# Patient Record
Sex: MALE | Race: BLACK OR AFRICAN AMERICAN | NOT HISPANIC OR LATINO | Employment: FULL TIME | URBAN - METROPOLITAN AREA
[De-identification: names, ages, dates, MRNs, and addresses within clinical notes are randomized per-mention and may not be internally consistent; named-entity substitution may affect disease eponyms.]

---

## 2021-10-04 ENCOUNTER — HISTORICAL (OUTPATIENT)
Dept: ADMINISTRATIVE | Facility: HOSPITAL | Age: 26
End: 2021-10-04

## 2021-10-04 LAB — SARS-COV-2 AG RESP QL IA.RAPID: NEGATIVE

## 2021-10-05 ENCOUNTER — HISTORICAL (OUTPATIENT)
Dept: SURGERY | Facility: HOSPITAL | Age: 26
End: 2021-10-05

## 2022-02-12 ENCOUNTER — HISTORICAL (OUTPATIENT)
Dept: ADMINISTRATIVE | Facility: HOSPITAL | Age: 27
End: 2022-02-12

## 2022-04-07 ENCOUNTER — HISTORICAL (OUTPATIENT)
Dept: ADMINISTRATIVE | Facility: HOSPITAL | Age: 27
End: 2022-04-07
Payer: MEDICAID

## 2022-04-24 VITALS
BODY MASS INDEX: 28.31 KG/M2 | WEIGHT: 244.69 LBS | OXYGEN SATURATION: 99 % | SYSTOLIC BLOOD PRESSURE: 99 MMHG | DIASTOLIC BLOOD PRESSURE: 58 MMHG | HEIGHT: 78 IN

## 2022-04-25 ENCOUNTER — HISTORICAL (OUTPATIENT)
Dept: ADMINISTRATIVE | Facility: HOSPITAL | Age: 27
End: 2022-04-25
Payer: MEDICAID

## 2022-05-31 ENCOUNTER — HOSPITAL ENCOUNTER (EMERGENCY)
Facility: HOSPITAL | Age: 27
Discharge: HOME | End: 2022-05-31
Attending: EMERGENCY MEDICINE
Payer: MEDICAID

## 2022-05-31 VITALS
SYSTOLIC BLOOD PRESSURE: 139 MMHG | DIASTOLIC BLOOD PRESSURE: 85 MMHG | WEIGHT: 248 LBS | TEMPERATURE: 99 F | BODY MASS INDEX: 28.69 KG/M2 | RESPIRATION RATE: 17 BRPM | HEART RATE: 50 BPM | OXYGEN SATURATION: 98 % | HEIGHT: 78 IN

## 2022-05-31 DIAGNOSIS — S01.01XA LACERATION OF SCALP, INITIAL ENCOUNTER: Primary | ICD-10-CM

## 2022-05-31 PROCEDURE — 99284 EMERGENCY DEPT VISIT MOD MDM: CPT | Mod: 25

## 2022-05-31 PROCEDURE — 25000003 PHARM REV CODE 250: Performed by: PHYSICIAN ASSISTANT

## 2022-05-31 PROCEDURE — 12013 RPR F/E/E/N/L/M 2.6-5.0 CM: CPT

## 2022-05-31 PROCEDURE — 25000003 PHARM REV CODE 250

## 2022-05-31 PROCEDURE — 12002 RPR S/N/AX/GEN/TRNK2.6-7.5CM: CPT

## 2022-05-31 RX ORDER — HYDROCODONE BITARTRATE AND ACETAMINOPHEN 5; 325 MG/1; MG/1
1 TABLET ORAL
Status: COMPLETED | OUTPATIENT
Start: 2022-05-31 | End: 2022-05-31

## 2022-05-31 RX ORDER — HYDROCODONE BITARTRATE AND ACETAMINOPHEN 5; 325 MG/1; MG/1
1 TABLET ORAL EVERY 6 HOURS PRN
Qty: 12 TABLET | Refills: 0 | Status: SHIPPED | OUTPATIENT
Start: 2022-05-31 | End: 2022-06-03

## 2022-05-31 RX ORDER — SULFAMETHOXAZOLE AND TRIMETHOPRIM 800; 160 MG/1; MG/1
1 TABLET ORAL 2 TIMES DAILY
Qty: 14 TABLET | Refills: 0 | Status: SHIPPED | OUTPATIENT
Start: 2022-05-31 | End: 2022-06-07

## 2022-05-31 RX ADMIN — HYDROCODONE BITARTRATE AND ACETAMINOPHEN 1 TABLET: 5; 325 TABLET ORAL at 07:05
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
## 2022-06-07 ENCOUNTER — HOSPITAL ENCOUNTER (EMERGENCY)
Facility: HOSPITAL | Age: 27
Discharge: HOME | End: 2022-06-07
Attending: EMERGENCY MEDICINE
Payer: MEDICAID

## 2022-06-07 VITALS
OXYGEN SATURATION: 99 % | WEIGHT: 240 LBS | SYSTOLIC BLOOD PRESSURE: 121 MMHG | BODY MASS INDEX: 27.77 KG/M2 | HEIGHT: 78 IN | DIASTOLIC BLOOD PRESSURE: 68 MMHG | TEMPERATURE: 99 F | RESPIRATION RATE: 20 BRPM | HEART RATE: 56 BPM

## 2022-06-07 DIAGNOSIS — Z48.02 REMOVAL OF STAPLE: Primary | ICD-10-CM

## 2022-06-07 PROCEDURE — 99282 EMERGENCY DEPT VISIT SF MDM: CPT

## 2022-06-07 NOTE — ED NOTES
3 staples removed from the back of patient's head. No abnormalities noted after removal. Pending disposition.
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
## 2023-01-02 ENCOUNTER — OFFICE VISIT (OUTPATIENT)
Dept: URGENT CARE | Facility: CLINIC | Age: 28
End: 2023-01-02
Payer: COMMERCIAL

## 2023-01-02 VITALS
DIASTOLIC BLOOD PRESSURE: 89 MMHG | HEIGHT: 77 IN | BODY MASS INDEX: 30.7 KG/M2 | SYSTOLIC BLOOD PRESSURE: 130 MMHG | OXYGEN SATURATION: 98 % | TEMPERATURE: 98 F | HEART RATE: 68 BPM | RESPIRATION RATE: 18 BRPM | WEIGHT: 260 LBS

## 2023-01-02 DIAGNOSIS — M79.671 RIGHT FOOT PAIN: Primary | ICD-10-CM

## 2023-01-02 PROCEDURE — 99213 PR OFFICE/OUTPT VISIT, EST, LEVL III, 20-29 MIN: ICD-10-PCS | Mod: ,,, | Performed by: FAMILY MEDICINE

## 2023-01-02 PROCEDURE — 99213 OFFICE O/P EST LOW 20 MIN: CPT | Mod: ,,, | Performed by: FAMILY MEDICINE

## 2023-01-02 RX ORDER — HYDROCODONE BITARTRATE AND ACETAMINOPHEN 5; 325 MG/1; MG/1
1 TABLET ORAL EVERY 8 HOURS PRN
Qty: 15 TABLET | Refills: 0 | Status: SHIPPED | OUTPATIENT
Start: 2023-01-02 | End: 2023-03-11
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
## 2023-01-02 NOTE — PATIENT INSTRUCTIONS
Recommend Compound W  Wart Removal plantar foot pads    Someone will call you with the dermatology appointment.

## 2023-01-04 ENCOUNTER — TELEPHONE (OUTPATIENT)
Dept: URGENT CARE | Facility: CLINIC | Age: 28
End: 2023-01-04
Payer: COMMERCIAL

## 2023-01-05 NOTE — TELEPHONE ENCOUNTER
Internal referral to [facility] was not accepted due to patient's insurance. Contacted patient and made aware that Dr. Carl Solis's office would accept services under self- pay. Patient agreed to terms and would only need referral to be rerouted.please advise.

## 2023-01-09 ENCOUNTER — TELEPHONE (OUTPATIENT)
Dept: URGENT CARE | Facility: CLINIC | Age: 28
End: 2023-01-09
Payer: COMMERCIAL

## 2023-01-09 NOTE — TELEPHONE ENCOUNTER
Referral Documentation 01/02/23    Spoke with Dr. Solis's [place] and she took down patient's contact information to set up an appointment. Faxed over referral for documentation. 01/09/23-ALL    Contacted several offices. Notified patient that Dr. Solis would see him with an out of pocket charge,and he agreed.Gave office number. Message sent to referring provider to advise. - 01/04/2023 -ALL    [facility] ;do not accept patien't insurance.01/03/2023- All    Referral Fax to [facility] Dr. Claudia Jackson on 01/03/2023-DJ

## 2023-01-12 ENCOUNTER — HOSPITAL ENCOUNTER (EMERGENCY)
Facility: HOSPITAL | Age: 28
Discharge: HOME | End: 2023-01-12
Attending: STUDENT IN AN ORGANIZED HEALTH CARE EDUCATION/TRAINING PROGRAM
Payer: COMMERCIAL

## 2023-01-12 VITALS
DIASTOLIC BLOOD PRESSURE: 88 MMHG | SYSTOLIC BLOOD PRESSURE: 138 MMHG | WEIGHT: 260 LBS | BODY MASS INDEX: 30.7 KG/M2 | OXYGEN SATURATION: 100 % | HEART RATE: 78 BPM | RESPIRATION RATE: 16 BRPM | TEMPERATURE: 98 F | HEIGHT: 77 IN

## 2023-01-12 DIAGNOSIS — R52 PAIN: ICD-10-CM

## 2023-01-12 DIAGNOSIS — B07.0 PLANTAR WART: Primary | ICD-10-CM

## 2023-01-12 PROCEDURE — 25000003 PHARM REV CODE 250: Performed by: PHYSICIAN ASSISTANT

## 2023-01-12 PROCEDURE — 99283 EMERGENCY DEPT VISIT LOW MDM: CPT

## 2023-01-12 RX ORDER — KETOROLAC TROMETHAMINE 10 MG/1
10 TABLET, FILM COATED ORAL
Status: COMPLETED | OUTPATIENT
Start: 2023-01-12 | End: 2023-01-12

## 2023-01-12 RX ORDER — KETOROLAC TROMETHAMINE 10 MG/1
10 TABLET, FILM COATED ORAL EVERY 6 HOURS PRN
Qty: 20 TABLET | Refills: 0 | Status: SHIPPED | OUTPATIENT
Start: 2023-01-12 | End: 2023-01-17

## 2023-01-12 RX ADMIN — KETOROLAC TROMETHAMINE 10 MG: 10 TABLET, FILM COATED ORAL at 09:01
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
## 2023-03-11 ENCOUNTER — HOSPITAL ENCOUNTER (EMERGENCY)
Facility: HOSPITAL | Age: 28
Discharge: HOME | End: 2023-03-11
Attending: EMERGENCY MEDICINE
Payer: COMMERCIAL

## 2023-03-11 VITALS
TEMPERATURE: 97 F | BODY MASS INDEX: 31.17 KG/M2 | HEART RATE: 71 BPM | WEIGHT: 264 LBS | DIASTOLIC BLOOD PRESSURE: 77 MMHG | HEIGHT: 77 IN | SYSTOLIC BLOOD PRESSURE: 151 MMHG | RESPIRATION RATE: 18 BRPM | OXYGEN SATURATION: 100 %

## 2023-03-11 DIAGNOSIS — B07.0 PLANTAR WART: Primary | ICD-10-CM

## 2023-03-11 DIAGNOSIS — M79.671 FOOT PAIN, RIGHT: ICD-10-CM

## 2023-03-11 PROCEDURE — 99283 EMERGENCY DEPT VISIT LOW MDM: CPT

## 2023-03-11 RX ORDER — SALICYLIC ACID 275 MG/ML
1 LIQUID TOPICAL DAILY
Qty: 10 ML | Refills: 0 | Status: SHIPPED | OUTPATIENT
Start: 2023-03-11 | End: 2023-03-21

## 2023-03-11 NOTE — Clinical Note
"Brian Coronado"Nusrat was seen and treated in our emergency department on 3/11/2023.  He may return to work on 03/20/2023.       If you have any questions or concerns, please don't hesitate to call.      Claudine Hernandez MD"
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
## 2023-04-26 ENCOUNTER — HOSPITAL ENCOUNTER (EMERGENCY)
Facility: HOSPITAL | Age: 28
Discharge: HOME | End: 2023-04-26
Attending: EMERGENCY MEDICINE
Payer: MEDICAID

## 2023-04-26 VITALS
HEIGHT: 77 IN | RESPIRATION RATE: 16 BRPM | SYSTOLIC BLOOD PRESSURE: 135 MMHG | DIASTOLIC BLOOD PRESSURE: 78 MMHG | HEART RATE: 93 BPM | WEIGHT: 265 LBS | TEMPERATURE: 98 F | BODY MASS INDEX: 31.29 KG/M2 | OXYGEN SATURATION: 95 %

## 2023-04-26 DIAGNOSIS — B07.0 PLANTAR WART OF RIGHT FOOT: Primary | ICD-10-CM

## 2023-04-26 PROCEDURE — 99283 EMERGENCY DEPT VISIT LOW MDM: CPT

## 2023-04-26 RX ORDER — DICLOFENAC SODIUM 50 MG/1
50 TABLET, DELAYED RELEASE ORAL 3 TIMES DAILY PRN
Qty: 15 TABLET | Refills: 0 | Status: SHIPPED | OUTPATIENT
Start: 2023-04-26 | End: 2023-05-01
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
## 2023-05-18 ENCOUNTER — HOSPITAL ENCOUNTER (EMERGENCY)
Facility: HOSPITAL | Age: 28
Discharge: HOME | End: 2023-05-18
Attending: INTERNAL MEDICINE
Payer: MEDICAID

## 2023-05-18 VITALS
WEIGHT: 258 LBS | BODY MASS INDEX: 29.85 KG/M2 | HEIGHT: 78 IN | RESPIRATION RATE: 18 BRPM | SYSTOLIC BLOOD PRESSURE: 150 MMHG | HEART RATE: 73 BPM | OXYGEN SATURATION: 98 % | DIASTOLIC BLOOD PRESSURE: 97 MMHG

## 2023-05-18 DIAGNOSIS — L08.9 LOCAL SKIN INFECTION: ICD-10-CM

## 2023-05-18 DIAGNOSIS — B07.0 PLANTAR WART, RIGHT FOOT: Primary | ICD-10-CM

## 2023-05-18 PROCEDURE — 25000003 PHARM REV CODE 250: Performed by: INTERNAL MEDICINE

## 2023-05-18 PROCEDURE — 99284 EMERGENCY DEPT VISIT MOD MDM: CPT

## 2023-05-18 RX ORDER — DOXYCYCLINE 100 MG/1
100 CAPSULE ORAL 2 TIMES DAILY
Qty: 14 CAPSULE | Refills: 0 | Status: SHIPPED | OUTPATIENT
Start: 2023-05-18 | End: 2023-05-25

## 2023-05-18 RX ORDER — HYDROCODONE BITARTRATE AND ACETAMINOPHEN 5; 325 MG/1; MG/1
1 TABLET ORAL ONCE
Status: COMPLETED | OUTPATIENT
Start: 2023-05-18 | End: 2023-05-18

## 2023-05-18 RX ORDER — MUPIROCIN 20 MG/G
OINTMENT TOPICAL 3 TIMES DAILY
Qty: 22 G | Refills: 0 | Status: SHIPPED | OUTPATIENT
Start: 2023-05-18 | End: 2023-12-26

## 2023-05-18 RX ADMIN — HYDROCODONE BITARTRATE AND ACETAMINOPHEN 1 TABLET: 5; 325 TABLET ORAL at 11:05
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
## 2023-06-13 ENCOUNTER — HOSPITAL ENCOUNTER (EMERGENCY)
Facility: HOSPITAL | Age: 28
Discharge: HOME | End: 2023-06-14
Attending: EMERGENCY MEDICINE
Payer: MEDICAID

## 2023-06-13 VITALS
RESPIRATION RATE: 18 BRPM | SYSTOLIC BLOOD PRESSURE: 130 MMHG | DIASTOLIC BLOOD PRESSURE: 81 MMHG | HEART RATE: 83 BPM | TEMPERATURE: 99 F | OXYGEN SATURATION: 96 % | BODY MASS INDEX: 28.93 KG/M2 | HEIGHT: 78 IN | WEIGHT: 250 LBS

## 2023-06-13 DIAGNOSIS — M25.511 RIGHT SHOULDER PAIN: ICD-10-CM

## 2023-06-13 DIAGNOSIS — S42.134A CLOSED NONDISPLACED FRACTURE OF CORACOID PROCESS OF RIGHT SHOULDER, INITIAL ENCOUNTER: Primary | ICD-10-CM

## 2023-06-13 DIAGNOSIS — S42.141A CLOSED FRACTURE OF RIM OF GLENOID FOSSA OF RIGHT SCAPULA, INITIAL ENCOUNTER: ICD-10-CM

## 2023-06-13 PROCEDURE — 25000003 PHARM REV CODE 250: Performed by: NURSE PRACTITIONER

## 2023-06-13 PROCEDURE — 99284 EMERGENCY DEPT VISIT MOD MDM: CPT | Mod: 25

## 2023-06-13 RX ORDER — HYDROCODONE BITARTRATE AND ACETAMINOPHEN 10; 325 MG/1; MG/1
1 TABLET ORAL
Status: COMPLETED | OUTPATIENT
Start: 2023-06-13 | End: 2023-06-13

## 2023-06-13 RX ORDER — HYDROCODONE BITARTRATE AND ACETAMINOPHEN 7.5; 325 MG/1; MG/1
1 TABLET ORAL EVERY 6 HOURS PRN
Qty: 16 TABLET | Refills: 0 | Status: SHIPPED | OUTPATIENT
Start: 2023-06-13 | End: 2023-12-26

## 2023-06-13 RX ORDER — MORPHINE SULFATE 4 MG/ML
4 INJECTION, SOLUTION INTRAMUSCULAR; INTRAVENOUS
Status: DISCONTINUED | OUTPATIENT
Start: 2023-06-13 | End: 2023-06-13

## 2023-06-13 RX ORDER — ONDANSETRON 2 MG/ML
4 INJECTION INTRAMUSCULAR; INTRAVENOUS
Status: DISCONTINUED | OUTPATIENT
Start: 2023-06-13 | End: 2023-06-13

## 2023-06-13 RX ADMIN — HYDROCODONE BITARTRATE AND ACETAMINOPHEN 1 TABLET: 10; 325 TABLET ORAL at 10:06

## 2023-06-14 NOTE — ED PROVIDER NOTES
Encounter Date: 6/13/2023       History     Chief Complaint   Patient presents with    Shoulder Injury     Ran into brick wall with R shoulder while playing basketball PTA. Able to move digits on R hand, not able to move shoulder joint. +2 radial pulses noted bilat. R arm sling place in triage     See MDM    The history is provided by the patient. No  was used.   Review of patient's allergies indicates:  No Known Allergies  Past Medical History:   Diagnosis Date    Known health problems: none      Past Surgical History:   Procedure Laterality Date    NO PAST SURGERIES       Family History   Problem Relation Age of Onset    No Known Problems Mother     No Known Problems Father      Social History     Tobacco Use    Smoking status: Never    Smokeless tobacco: Never   Substance Use Topics    Alcohol use: Never    Drug use: Never     Review of Systems   Constitutional:  Negative for fever.   Respiratory:  Negative for cough and shortness of breath.    Cardiovascular:  Negative for chest pain.   Gastrointestinal:  Negative for abdominal pain.   Genitourinary:  Negative for difficulty urinating and dysuria.   Musculoskeletal:  Negative for gait problem.   Skin:  Negative for color change.   Neurological:  Negative for dizziness, speech difficulty and headaches.   Psychiatric/Behavioral:  Negative for hallucinations and suicidal ideas.    All other systems reviewed and are negative.    Physical Exam     Initial Vitals [06/13/23 2022]   BP Pulse Resp Temp SpO2   130/81 83 20 98.6 °F (37 °C) 96 %      MAP       --         Physical Exam    Nursing note and vitals reviewed.  Constitutional: He appears well-developed and well-nourished.   HENT:   Head: Normocephalic.   Eyes: EOM are normal.   Neck: Neck supple.   Normal range of motion.  Cardiovascular:  Normal rate, regular rhythm, normal heart sounds and intact distal pulses.           Pulmonary/Chest: Breath sounds normal.   Abdominal: Abdomen is soft.  Bowel sounds are normal.   Musculoskeletal:         General: Normal range of motion.      Cervical back: Normal range of motion and neck supple.      Comments: Tenderness to anterior and posterior right shoulder     Neurological: He is alert and oriented to person, place, and time. He has normal strength.   Skin: Skin is warm and dry. Capillary refill takes less than 2 seconds.   Psychiatric: He has a normal mood and affect. His behavior is normal. Judgment and thought content normal.       ED Course   Procedures  Labs Reviewed - No data to display       Imaging Results              CT Shoulder Without Contrast Right (Preliminary result)  Result time 06/13/23 22:45:13      Preliminary result by Neo Mead Jr., MD (06/13/23 22:45:13)                   Narrative:    START OF REPORT:  TECHNIQUE: CT OF THE RIGHT SHOULDER WAS PERFORMED WITHOUT INTRAVENOUS CONTRAST WITH DIRECT AXIAL AS WELL AS SAGITTAL AND CORONAL REFORMATIONS.    COMPARISON: COMPARISON IS WITH STUDY DATED 2023-06-13 20:50:20.    CLINICAL HISTORY: PLS SEND PRIOR ---FALL SHOULDER INJURY.    Findings:  Thorax: The visualized structures of the thorax appear unremarkable.  Shoulder:  Clavicle: The clavicle appears unremarkable.  Scapula: There is a fracture of the superior border of the right scapula and the coracoid process with displacement of the fracture fragments.  Humerus: No fracture is seen in the visualized proximal humerus.  Shoulder joint:  Shoulder dislocation: There is a mildly displaced fracture of the right glenoid rim with intra articular extension seen on Series 6 Image 95.  Upper arm: The visualized structures of the upper arm appear unremarkable.      Impression:  1. There is a fracture of the superior border of the right scapula and the coracoid process with displacement of the fracture fragments.  2. There is a mildly displaced fracture of the right glenoid rim with intra articular extension seen on Series 6 Image 95.  3. Other  details and findings as discussed above.                                         X-Ray Shoulder Complete 2 View Right (Final result)  Result time 06/13/23 21:27:05      Final result by Xavi Jorge MD (06/13/23 21:27:05)                   Impression:      No osseous abnormality identified.      Electronically signed by: Xavi Jorge  Date:    06/13/2023  Time:    21:27               Narrative:    EXAMINATION:  XR SHOULDER COMPLETE 2 OR MORE VIEWS RIGHT    CLINICAL HISTORY:  Pain in right shoulder    TECHNIQUE:  Three views.    COMPARISON:  December 10, 2018    FINDINGS:  The osseous and articular surfaces are unremarkable.  There is no acute fracture, dislocation or arthritic change.  Alignment and position are unremarkable.  There is unremarkable mineralization of the bones.  No soft tissue calcifications identified.                                       Medications   HYDROcodone-acetaminophen  mg per tablet 1 tablet (1 tablet Oral Given 6/13/23 2240)     Medical Decision Making:   Initial Assessment:   Historian:  Patient.  Patient is a 27-year-old male  that presents with injury to right shoulder that has been present today. Associated symptoms shoulder pain. Surrounding information is patient was playing basketball and ran into the wall injuring his right shoulder. Exacerbated by movement and palpation. Relieved by nothing. Patient treatment prior to arrival none. Risk factors include none. Other history pertaining to this complaint nothing.   Assessment:  See physical exam.    Differential Diagnosis:   Showed the sprain, shoulder strain, shoulder dislocation, rotator cuff injury, shoulder fracture  ED Management:  History was obtained.  Physical was performed.  CT scan shows a right scapula fracture and a right glenoid rim fracture.  Patient is currently in a sling.  I did discuss the case with emergency room physician Dr. Castro.  We will send him a referral to orthopedics at [facility].  Patient placed on  Norco.  No medical or surgical consult indicated in ER.  No social determinants that affect healthcare were noted.                        Clinical Impression:   Final diagnoses:  [M25.511] Right shoulder pain  [S42.134A] Closed nondisplaced fracture of coracoid process of right shoulder, initial encounter (Primary)  [S42.141A] Closed fracture of rim of glenoid fossa of right scapula, initial encounter        ED Disposition Condition    Discharge Stable          ED Prescriptions       Medication Sig Dispense Start Date End Date Auth. Provider    HYDROcodone-acetaminophen (NORCO) 7.5-325 mg per tablet Take 1 tablet by mouth every 6 (six) hours as needed for Pain. 16 tablet 6/13/2023 -- FLAVIO Mejia          Follow-up Information       Follow up With Specialties Details Why Contact Info    Follow-up with orthopedics at [facility]   ed follow up Office will call with follow-up appointment             FLAVIO Mejia  06/13/23 7856

## 2023-06-14 NOTE — FIRST PROVIDER EVALUATION
"Medical screening examination initiated.  I have conducted a focused provider triage encounter, findings are as follows:    Brief history of present illness:  27 year old male presents to ER with c/o right shoulder pain. States that he ran into wall just PTA.    Vitals:    06/13/23 2022   BP: 130/81   Pulse: 83   Resp: 20   Temp: 98.6 °F (37 °C)   TempSrc: Oral   SpO2: 96%   Weight: 113.4 kg (250 lb)   Height: 6' 6" (1.981 m)       Pertinent physical exam:  Awake and alert, nad    Brief workup plan:  imaging, meds    Preliminary workup initiated; this workup will be continued and followed by the physician or advanced practice provider that is assigned to the patient when roomed.  "

## 2023-07-07 ENCOUNTER — OFFICE VISIT (OUTPATIENT)
Dept: ORTHOPEDICS | Facility: CLINIC | Age: 28
End: 2023-07-07
Payer: MEDICAID

## 2023-07-07 ENCOUNTER — HOSPITAL ENCOUNTER (OUTPATIENT)
Dept: RADIOLOGY | Facility: HOSPITAL | Age: 28
Discharge: HOME | End: 2023-07-07
Attending: STUDENT IN AN ORGANIZED HEALTH CARE EDUCATION/TRAINING PROGRAM
Payer: MEDICAID

## 2023-07-07 VITALS
WEIGHT: 255.19 LBS | DIASTOLIC BLOOD PRESSURE: 79 MMHG | HEIGHT: 78 IN | RESPIRATION RATE: 20 BRPM | HEART RATE: 80 BPM | SYSTOLIC BLOOD PRESSURE: 116 MMHG | BODY MASS INDEX: 29.53 KG/M2

## 2023-07-07 DIAGNOSIS — S42.141A CLOSED FRACTURE OF RIM OF GLENOID FOSSA OF RIGHT SCAPULA, INITIAL ENCOUNTER: ICD-10-CM

## 2023-07-07 DIAGNOSIS — S42.134A CLOSED NONDISPLACED FRACTURE OF CORACOID PROCESS OF RIGHT SHOULDER, INITIAL ENCOUNTER: ICD-10-CM

## 2023-07-07 PROCEDURE — 3078F DIAST BP <80 MM HG: CPT | Mod: CPTII,,, | Performed by: SPECIALIST

## 2023-07-07 PROCEDURE — 99214 OFFICE O/P EST MOD 30 MIN: CPT | Mod: S$PBB,,, | Performed by: SPECIALIST

## 2023-07-07 PROCEDURE — 73030 X-RAY EXAM OF SHOULDER: CPT | Mod: TC,RT

## 2023-07-07 PROCEDURE — 99213 OFFICE O/P EST LOW 20 MIN: CPT | Mod: PBBFAC

## 2023-07-07 PROCEDURE — 3074F PR MOST RECENT SYSTOLIC BLOOD PRESSURE < 130 MM HG: ICD-10-PCS | Mod: CPTII,,, | Performed by: SPECIALIST

## 2023-07-07 PROCEDURE — 3074F SYST BP LT 130 MM HG: CPT | Mod: CPTII,,, | Performed by: SPECIALIST

## 2023-07-07 PROCEDURE — 3008F BODY MASS INDEX DOCD: CPT | Mod: CPTII,,, | Performed by: SPECIALIST

## 2023-07-07 PROCEDURE — 1159F MED LIST DOCD IN RCRD: CPT | Mod: CPTII,,, | Performed by: SPECIALIST

## 2023-07-07 PROCEDURE — 3078F PR MOST RECENT DIASTOLIC BLOOD PRESSURE < 80 MM HG: ICD-10-PCS | Mod: CPTII,,, | Performed by: SPECIALIST

## 2023-07-07 PROCEDURE — 3008F PR BODY MASS INDEX (BMI) DOCUMENTED: ICD-10-PCS | Mod: CPTII,,, | Performed by: SPECIALIST

## 2023-07-07 PROCEDURE — 1159F PR MEDICATION LIST DOCUMENTED IN MEDICAL RECORD: ICD-10-PCS | Mod: CPTII,,, | Performed by: SPECIALIST

## 2023-07-07 PROCEDURE — 99214 PR OFFICE/OUTPT VISIT, EST, LEVL IV, 30-39 MIN: ICD-10-PCS | Mod: S$PBB,,, | Performed by: SPECIALIST

## 2023-07-07 NOTE — PROGRESS NOTES
[facility]  New Patient Office Visit  07/07/2023       Patient ID: Brian Silverio  YOB: 1995  MRN: [id number]      Brian Silverio is a 27 y.o. male right-hand dominant male who was playing basketball on 06/13/2023 when he ran into a wall sustaining a right glenoid/coracoid fracture.  Patient had immediate an isolated pain to his right shoulder.  There was no open wound or abrasion at the time of injury.  He reports that there was no shoulder dislocation.  He went to the emergency room and placed in a sling.   He comes in today for initial evaluation by us.  Patient reports he worries sling for a week and then discontinued it because it was uncomfortable.  He has not been lifting heavy objects.    He is no significant past medical history or past surgical history.  He takes no medications.  He does not smoke cigarettes.  He rarely drinks alcohol.  He does not use illicit drugs.  He works as a     Past Medical History:    Past Medical History:   Diagnosis Date    Known health problems: none      Past Surgical History:   Procedure Laterality Date    NO PAST SURGERIES       Family History   Problem Relation Age of Onset    No Known Problems Mother     No Known Problems Father      Social History     Socioeconomic History    Marital status: Single   Tobacco Use    Smoking status: Never    Smokeless tobacco: Never   Substance and Sexual Activity    Alcohol use: Never    Drug use: Never     Medication List with Changes/Refills   Current Medications    HYDROCODONE-ACETAMINOPHEN (NORCO) 7.5-325 MG PER TABLET    Take 1 tablet by mouth every 6 (six) hours as needed for Pain.    MUPIROCIN (BACTROBAN) 2 % OINTMENT    Apply topically 3 (three) times daily.     Review of patient's allergies indicates:  No Known Allergies    ROS:    Body mass index is 29.49 kg/m².  GENERAL: Well appearing, appropriate for stated age, no acute distress.  CARDIOVASCULAR: Pulses  regular by peripheral palpation.  PULMONARY: Respirations are even and non-labored.  NEURO: Awake, alert, and oriented x 3.  PSYCH: Mood & affect are appropriate.  HEENT: Head is normocephalic and atraumatic.    Physical Exam:    Right upper extremity:   No open wounds, abrasions, visible swelling  Tenderness to palpation over the anterior aspect of the shoulder  Motor intact: AIN/PIN/M/U/R/Mu/A  5/5 shoulder abduction/external rotation/internal rotation  Shoulder abduction 100°, forward flexion 150°  Full range of motion of elbow and wrist.  Full supination and pronation  2+ radial pulse    Imaging:  CT right shoulder:  Glenoid fracture involving approximately 50% of the articular surface with significant step-off.  It extends into the base of the coracoid process    Assessment and Plan:    Brian Silverio is a 27 y.o. male seen in the office today for a right intra-articular glenoid fracture involving 50% of the articular surface extending into the coracoid process.  He sustained this on 06/13/2023 while playing basketball.  Patient reports no dislocation at the time of injury    - Patient instructed to work on range of motion.  Demonstrated wall climbs and pendulum swings   - Weightbearing less than 5 lb   - No sling needed   - Consented the patient for ORIF right glenoid.  Patient is tentatively booked for July 25th with Dr. Parekh. We may move this up pending availability    Yehuda Ricardo  [facility] Orthopaedic Surgery PGY-3    Orders Placed This Encounter    X-ray Shoulder 2 or More Views Right

## 2023-07-10 NOTE — PROGRESS NOTES
Faculty Attestation: Brian Silverio  was seen at [facility] in the Orthopaedic Clinic. Patient seen and evaluated at the time of the visit. History of Present Illness, Physical Exam, and Assessment and Plan reviewed. Treatment plan is reasonable and appropriate. Compliance with treatment recommendations is important. No procedure was performed.  Radiographic studies have been reviewed by myself with Dr. Cottrell.  Due to the superior nature of the fracture line attached to the entire piece of the coracoid process, he will be a very difficult fracture to reduce without having to take down the entire subscapularis which would put the patient at risk of even greater complications such as AVN in the future at such a young age.  He will have a small articular step-off but despite this nonoperative treatment is recommended.    Ramón Leija MD  Orthopaedic Surgery

## 2023-07-11 NOTE — PROGRESS NOTES
After discussion of patient's condition with local shoulder expert, recommend continuing non-operative treatment. Attempted to call patient regarding surgery cancellation and continuing non-operative treatment, however no response from patient's number or mom's number.

## 2023-07-12 ENCOUNTER — TELEPHONE (OUTPATIENT)
Dept: ORTHOPEDICS | Facility: CLINIC | Age: 28
End: 2023-07-12
Payer: MEDICAID

## 2023-07-12 NOTE — TELEPHONE ENCOUNTER
After discussion with orthopaedic surgical team, a decision was made that nonoperative treatment would be better for this patient than surgical treatment.     Attempted to call patient 7/11 and 7/12 to notify patient of cancelled surgery. Patient only has 1 phone number in the system. His phone is not able to accept phone calls at this time.

## 2023-12-26 ENCOUNTER — HOSPITAL ENCOUNTER (EMERGENCY)
Facility: HOSPITAL | Age: 28
Discharge: HOME | End: 2023-12-26
Attending: EMERGENCY MEDICINE
Payer: MEDICAID

## 2023-12-26 VITALS
BODY MASS INDEX: 32.73 KG/M2 | OXYGEN SATURATION: 98 % | WEIGHT: 255 LBS | SYSTOLIC BLOOD PRESSURE: 153 MMHG | HEIGHT: 74 IN | RESPIRATION RATE: 20 BRPM | TEMPERATURE: 98 F | DIASTOLIC BLOOD PRESSURE: 79 MMHG | HEART RATE: 72 BPM

## 2023-12-26 DIAGNOSIS — S60.511A ABRASION OF HAND AND FINGERS, RIGHT, INITIAL ENCOUNTER: ICD-10-CM

## 2023-12-26 DIAGNOSIS — S60.419A ABRASION OF HAND AND FINGERS, RIGHT, INITIAL ENCOUNTER: ICD-10-CM

## 2023-12-26 DIAGNOSIS — S41.119A LACERATION OF ARM: Primary | ICD-10-CM

## 2023-12-26 PROCEDURE — 63600175 PHARM REV CODE 636 W HCPCS: Performed by: NURSE PRACTITIONER

## 2023-12-26 PROCEDURE — 12002 RPR S/N/AX/GEN/TRNK2.6-7.5CM: CPT

## 2023-12-26 PROCEDURE — 90715 TDAP VACCINE 7 YRS/> IM: CPT | Performed by: NURSE PRACTITIONER

## 2023-12-26 PROCEDURE — 99284 EMERGENCY DEPT VISIT MOD MDM: CPT | Mod: 25

## 2023-12-26 PROCEDURE — 25000003 PHARM REV CODE 250: Performed by: PHYSICIAN ASSISTANT

## 2023-12-26 PROCEDURE — 90471 IMMUNIZATION ADMIN: CPT | Performed by: NURSE PRACTITIONER

## 2023-12-26 PROCEDURE — 25000003 PHARM REV CODE 250: Performed by: NURSE PRACTITIONER

## 2023-12-26 RX ORDER — HYDROCODONE BITARTRATE AND ACETAMINOPHEN 5; 325 MG/1; MG/1
1 TABLET ORAL EVERY 12 HOURS PRN
Qty: 6 TABLET | Refills: 0 | Status: SHIPPED | OUTPATIENT
Start: 2023-12-26 | End: 2023-12-29

## 2023-12-26 RX ORDER — IBUPROFEN 600 MG/1
600 TABLET ORAL
Status: COMPLETED | OUTPATIENT
Start: 2023-12-26 | End: 2023-12-26

## 2023-12-26 RX ORDER — MUPIROCIN 20 MG/G
OINTMENT TOPICAL 3 TIMES DAILY
Qty: 2 G | Refills: 0 | Status: SHIPPED | OUTPATIENT
Start: 2023-12-26 | End: 2023-12-26

## 2023-12-26 RX ORDER — IBUPROFEN 600 MG/1
600 TABLET ORAL EVERY 6 HOURS PRN
Qty: 20 TABLET | Refills: 0 | Status: SHIPPED | OUTPATIENT
Start: 2023-12-26

## 2023-12-26 RX ORDER — CEPHALEXIN 500 MG/1
500 CAPSULE ORAL EVERY 8 HOURS
Qty: 21 CAPSULE | Refills: 0 | Status: SHIPPED | OUTPATIENT
Start: 2023-12-26 | End: 2024-01-02

## 2023-12-26 RX ORDER — MUPIROCIN 20 MG/G
OINTMENT TOPICAL 3 TIMES DAILY
Qty: 2 G | Refills: 0 | Status: SHIPPED | OUTPATIENT
Start: 2023-12-26

## 2023-12-26 RX ADMIN — BACITRACIN ZINC, NEOMYCIN, POLYMYXIN B: 400; 3.5; 5 OINTMENT TOPICAL at 05:12

## 2023-12-26 RX ADMIN — IBUPROFEN 600 MG: 600 TABLET, FILM COATED ORAL at 03:12

## 2023-12-26 RX ADMIN — TETANUS TOXOID, REDUCED DIPHTHERIA TOXOID AND ACELLULAR PERTUSSIS VACCINE, ADSORBED 0.5 ML: 5; 2.5; 8; 8; 2.5 SUSPENSION INTRAMUSCULAR at 04:12

## 2023-12-26 NOTE — DISCHARGE INSTRUCTIONS
Keep area clean and dry. Wash with gentle soap and water. Take full course of antibiotics. Return to ED inf 7-10 days for suture removal.
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
## 2024-05-31 ENCOUNTER — HOSPITAL ENCOUNTER (EMERGENCY)
Facility: HOSPITAL | Age: 29
Discharge: HOME | End: 2024-06-01
Attending: EMERGENCY MEDICINE
Payer: MEDICAID

## 2024-05-31 DIAGNOSIS — L25.9 CONTACT DERMATITIS, UNSPECIFIED CONTACT DERMATITIS TYPE, UNSPECIFIED TRIGGER: Primary | ICD-10-CM

## 2024-05-31 DIAGNOSIS — H61.23 BILATERAL IMPACTED CERUMEN: ICD-10-CM

## 2024-05-31 PROCEDURE — 99284 EMERGENCY DEPT VISIT MOD MDM: CPT

## 2024-05-31 RX ORDER — PREDNISONE 50 MG/1
50 TABLET ORAL DAILY
Qty: 10 TABLET | Refills: 0 | Status: SHIPPED | OUTPATIENT
Start: 2024-05-31

## 2024-05-31 RX ORDER — HYDROXYZINE PAMOATE 25 MG/1
25 CAPSULE ORAL EVERY 6 HOURS PRN
Qty: 24 CAPSULE | Refills: 0 | Status: SHIPPED | OUTPATIENT
Start: 2024-05-31

## 2024-05-31 NOTE — Clinical Note
"Brian Coronado"Nusrat was seen and treated in our emergency department on 5/31/2024.  He may return to work on 06/01/2024.       If you have any questions or concerns, please don't hesitate to call.      Yumiko Avalos MD"

## 2024-06-01 VITALS
OXYGEN SATURATION: 98 % | RESPIRATION RATE: 20 BRPM | HEART RATE: 65 BPM | DIASTOLIC BLOOD PRESSURE: 85 MMHG | SYSTOLIC BLOOD PRESSURE: 142 MMHG | HEIGHT: 77 IN | TEMPERATURE: 99 F | BODY MASS INDEX: 30.23 KG/M2 | WEIGHT: 256 LBS

## 2024-06-01 NOTE — ED PROVIDER NOTES
Encounter Date: 5/31/2024       History     Chief Complaint   Patient presents with    Rash     Pt to with rash since yesterday, pt has had change in laundry detergent in the last few days     Patient is a 27 yo M presenting with diffuse rash. Has associated itching. No family member with similar symptoms. He did recently change his body wash. He has also noted some decreased hearing bilaterally. He denies any fevers, chills, chest pain, shortness of breath, abdominal pain, nausea, vomiting, diarrhea or other complaints. They have otherwise been at their baseline health. Also been having itchy, watery eyes.           Review of patient's allergies indicates:  No Known Allergies  Past Medical History:   Diagnosis Date    Known health problems: none      Past Surgical History:   Procedure Laterality Date    NO PAST SURGERIES       Family History   Problem Relation Name Age of Onset    No Known Problems Mother      No Known Problems Father       Social History     Tobacco Use    Smoking status: Never    Smokeless tobacco: Never   Substance Use Topics    Alcohol use: Never    Drug use: Never     Review of Systems   Constitutional:  Negative for fever.   HENT:  Negative for sore throat.         Hearing change   Respiratory:  Negative for shortness of breath.    Cardiovascular:  Negative for chest pain.   Gastrointestinal:  Negative for nausea.   Genitourinary:  Negative for dysuria.   Musculoskeletal:  Negative for back pain.   Skin:  Positive for rash.   Neurological:  Negative for weakness.   Hematological:  Does not bruise/bleed easily.       Physical Exam     Initial Vitals [05/31/24 2347]   BP Pulse Resp Temp SpO2   (!) 142/85 65 20 98.8 °F (37.1 °C) 98 %      MAP       --         Physical Exam    Nursing note and vitals reviewed.  Constitutional: He appears well-developed and well-nourished. He is not diaphoretic. No distress.   HENT:   Head: Normocephalic and atraumatic.   Cerumen impaction bilaterally   Eyes:    Mildly injected conjunctival bilaterally   Neck: Neck supple.   Cardiovascular:  Normal rate, regular rhythm and normal heart sounds.           Pulmonary/Chest: Breath sounds normal. No respiratory distress. He has no wheezes. He has no rhonchi.   Abdominal: Abdomen is soft. Bowel sounds are normal. He exhibits no distension. There is no abdominal tenderness. There is no rebound and no guarding.   Musculoskeletal:         General: No edema. Normal range of motion.      Cervical back: Neck supple.     Neurological: He is alert and oriented to person, place, and time.   Skin: Skin is warm and dry. Rash (faint erythmeatous rash over arms and face, consistent with contact dermatitis) noted.   Psychiatric: He has a normal mood and affect. Thought content normal.         ED Course   Procedures  Labs Reviewed - No data to display       Imaging Results    None          Medications - No data to display  Medical Decision Making  Discussed with patient likely new body wash and laundry detergent. Recommended switching to hypo allogenic options.  Questions were answered along with a discussion of signs and symptoms to return for. Patient comfortable with plan of discharge.      Problems Addressed:  Bilateral impacted cerumen: acute illness or injury  Contact dermatitis, unspecified contact dermatitis type, unspecified trigger: acute illness or injury    Risk  OTC drugs.  Prescription drug management.                                      Clinical Impression:  Final diagnoses:  [L25.9] Contact dermatitis, unspecified contact dermatitis type, unspecified trigger (Primary)  [H61.23] Bilateral impacted cerumen          ED Disposition Condition    Discharge Stable          ED Prescriptions       Medication Sig Dispense Start Date End Date Auth. Provider    carbamide peroxide (DEBROX) 6.5 % otic solution Place 5 drops into both ears as needed. 15 mL 5/31/2024 -- Yumiko Avalos MD    predniSONE (DELTASONE) 50 MG Tab Take 1 tablet  (50 mg total) by mouth once daily. 10 tablet 5/31/2024 -- Yumiko Avalos MD    hydrOXYzine pamoate (VISTARIL) 25 MG Cap Take 1 capsule (25 mg total) by mouth every 6 (six) hours as needed (itching). 24 capsule 5/31/2024 -- Yumiko Avalos MD          Follow-up Information       Follow up With Specialties Details Why Contact Info    [facility] -  In 2 days If symptoms worsen, return to the ED [address] [place] [id number] [phone]               Yumiko Avalos MD  06/01/24 0839

## 2025-01-08 ENCOUNTER — HOSPITAL ENCOUNTER (EMERGENCY)
Facility: HOSPITAL | Age: 30
Discharge: HOME | End: 2025-01-08
Attending: EMERGENCY MEDICINE
Payer: MEDICAID

## 2025-01-08 VITALS
OXYGEN SATURATION: 99 % | TEMPERATURE: 98 F | HEART RATE: 69 BPM | SYSTOLIC BLOOD PRESSURE: 141 MMHG | BODY MASS INDEX: 31.29 KG/M2 | RESPIRATION RATE: 20 BRPM | DIASTOLIC BLOOD PRESSURE: 85 MMHG | WEIGHT: 265 LBS | HEIGHT: 77 IN

## 2025-01-08 DIAGNOSIS — M25.572 LEFT ANKLE PAIN, UNSPECIFIED CHRONICITY: Primary | ICD-10-CM

## 2025-01-08 DIAGNOSIS — M25.572 ANKLE PAIN, LEFT: ICD-10-CM

## 2025-01-08 PROCEDURE — 99284 EMERGENCY DEPT VISIT MOD MDM: CPT | Mod: 25

## 2025-01-08 PROCEDURE — 63600175 PHARM REV CODE 636 W HCPCS: Performed by: NURSE PRACTITIONER

## 2025-01-08 PROCEDURE — 96372 THER/PROPH/DIAG INJ SC/IM: CPT | Performed by: NURSE PRACTITIONER

## 2025-01-08 RX ORDER — KETOROLAC TROMETHAMINE 30 MG/ML
60 INJECTION, SOLUTION INTRAMUSCULAR; INTRAVENOUS
Status: COMPLETED | OUTPATIENT
Start: 2025-01-08 | End: 2025-01-08

## 2025-01-08 RX ORDER — HYDROCODONE BITARTRATE AND ACETAMINOPHEN 5; 325 MG/1; MG/1
1 TABLET ORAL EVERY 6 HOURS PRN
Qty: 12 TABLET | Refills: 0 | Status: SHIPPED | OUTPATIENT
Start: 2025-01-08

## 2025-01-08 RX ORDER — IBUPROFEN 600 MG/1
600 TABLET ORAL EVERY 8 HOURS PRN
Qty: 15 TABLET | Refills: 0 | Status: SHIPPED | OUTPATIENT
Start: 2025-01-08

## 2025-01-08 RX ADMIN — KETOROLAC TROMETHAMINE 60 MG: 30 INJECTION, SOLUTION INTRAMUSCULAR at 08:01

## 2025-01-09 NOTE — ED PROVIDER NOTES
Encounter Date: 1/8/2025       History     Chief Complaint   Patient presents with    Ankle Pain     Pt to er with left ankle/foot pain. Denies recent injury      Patient states left ankle pain x1 day.  Denies any injury or trauma.  Patient states history of a previous left ankle fracture and states that he has had intermittent left ankle pain times years.  Denies any redness or swelling or fever.  Patient states that pain is intermittent and worsens with movement and palpation.  History of an ankle surgery.    The history is provided by the patient.   Ankle Pain  This is a chronic problem. The current episode started 12 to 24 hours ago. Episode frequency: Intermittently. The problem has not changed since onset.Pertinent negatives include no chest pain, no abdominal pain, no headaches and no shortness of breath. Exacerbated by: Movement and palpation. The symptoms are relieved by rest.     Review of patient's allergies indicates:  No Known Allergies  Past Medical History:   Diagnosis Date    Known health problems: none      Past Surgical History:   Procedure Laterality Date    ANKLE SURGERY Left     NO PAST SURGERIES       Family History   Problem Relation Name Age of Onset    No Known Problems Mother      No Known Problems Father       Social History     Tobacco Use    Smoking status: Never    Smokeless tobacco: Never   Substance Use Topics    Alcohol use: Never    Drug use: Never     Review of Systems   Constitutional: Negative.  Negative for chills and fever.   HENT: Negative.     Eyes: Negative.    Respiratory: Negative.  Negative for shortness of breath.    Cardiovascular: Negative.  Negative for chest pain.   Gastrointestinal: Negative.  Negative for abdominal pain.   Endocrine: Negative.    Genitourinary: Negative.    Musculoskeletal:         Ankle pain.   Skin: Negative.    Allergic/Immunologic: Negative.    Neurological: Negative.  Negative for weakness, numbness and headaches.   Hematological: Negative.     Psychiatric/Behavioral: Negative.     All other systems reviewed and are negative.      Physical Exam     Initial Vitals   BP Pulse Resp Temp SpO2   01/08/25 1933 01/08/25 1933 01/08/25 1933 01/08/25 1939 01/08/25 1933   (!) 141/85 69 20 97.8 °F (36.6 °C) 99 %      MAP       --                Physical Exam    Nursing note and vitals reviewed.  Constitutional: He appears well-developed and well-nourished. No distress.   HENT:   Head: Normocephalic and atraumatic. Mouth/Throat: Oropharynx is clear and moist.   Eyes: Conjunctivae and EOM are normal. Pupils are equal, round, and reactive to light.   Neck: Neck supple.   Normal range of motion.  Cardiovascular:  Normal rate, regular rhythm, normal heart sounds and intact distal pulses.           Pulses:       Dorsalis pedis pulses are 2+ on the right side and 2+ on the left side.   Pulmonary/Chest: Breath sounds normal. No respiratory distress. He has no wheezes.   Abdominal: Abdomen is soft. Bowel sounds are normal. He exhibits no distension. There is no abdominal tenderness.   Musculoskeletal:         General: No edema. Normal range of motion.      Cervical back: Normal range of motion and neck supple.      Right ankle: Normal.      Left ankle: No swelling or deformity. Tenderness present over the lateral malleolus. Normal range of motion.      Right foot: Normal.      Left foot: Normal.        Legs:      Neurological: He is alert and oriented to person, place, and time. He has normal strength. Gait normal. GCS score is 15. GCS eye subscore is 4. GCS verbal subscore is 5. GCS motor subscore is 6.   Skin: Skin is warm and dry. No rash noted.   Psychiatric: He has a normal mood and affect. Thought content normal.         ED Course   Procedures  Labs Reviewed - No data to display       Imaging Results              X-Ray Ankle Complete Left (Final result)  Result time 01/08/25 20:32:50      Final result by Zeyad Avalos MD (01/08/25 20:32:50)                    Impression:      No acute abnormalities are seen      Electronically signed by: Zeyad Avalos MD  Date:    01/08/2025  Time:    20:32               Narrative:    EXAMINATION:  XR ANKLE COMPLETE 3 VIEW LEFT    CLINICAL HISTORY:  Pain in left ankle and joints of left foot    TECHNIQUE:  AP, lateral and oblique views of the left ankle were performed.    COMPARISON:  None    FINDINGS:  There are no fractures seen.  There is no dislocation.  There are no bony lesions noted.                                       Medications   ketorolac injection 60 mg (60 mg Intramuscular Given 1/8/25 2019)     Medical Decision Making  Patient states left ankle pain x1 day.  Denies any injury or trauma.  Patient states history of a previous left ankle fracture and states that he has had intermittent left ankle pain times years.  Denies any redness or swelling or fever.  Patient states that pain is intermittent and worsens with movement and palpation.  History of an ankle surgery.    The history is provided by the patient.   Ankle Pain  This is a chronic problem. The current episode started 12 to 24 hours ago. Episode frequency: Intermittently. The problem has not changed since onset.Pertinent negatives include no chest pain, no abdominal pain, no headaches and no shortness of breath. Exacerbated by: Movement and palpation. The symptoms are relieved by rest.       Amount and/or Complexity of Data Reviewed  Radiology: ordered. Decision-making details documented in ED Course.  Discussion of management or test interpretation with external provider(s): Differential diagnosis (including but not limited to):   Judging by the patient's chief complaint and pertinent history, the patient has the following possible differential diagnoses, including but not limited to the following.  Some of these are deemed to be lower likelihood and some more likely based on my physical exam and history combined with possible lab work and/or imaging studies.    Please see the pertinent studies, and refer to the HPI.  Some of these diagnoses will take further evaluation to fully rule out, perhaps as an outpatient and the patient was encouraged to follow up when discharged for more comprehensive evaluation.  Sprain, strain, arthritis, ankle pain  Patient's x-ray of his left ankle does not show any acute change.  Patient was given Toradol IM for pain in the ED. discussed results with patient.  We will discharge patient with pain control.  ED return precautions given.      Risk  Prescription drug management.               ED Course as of 01/08/25 2052 Wed Jan 08, 2025 2052 X-Ray Ankle Complete Left  No acute abnormality seen. [AB]      ED Course User Index  [AB] Rhonda Enrique FNP                           Clinical Impression:  Final diagnoses:  [M25.572] Ankle pain, left                 Rhonda Enrique FNP  01/08/25 2056

## 2025-01-16 ENCOUNTER — HOSPITAL ENCOUNTER (EMERGENCY)
Facility: HOSPITAL | Age: 30
Discharge: HOME | End: 2025-01-16
Attending: EMERGENCY MEDICINE
Payer: MEDICAID

## 2025-01-16 VITALS
RESPIRATION RATE: 18 BRPM | OXYGEN SATURATION: 100 % | HEIGHT: 77 IN | WEIGHT: 265 LBS | BODY MASS INDEX: 31.29 KG/M2 | HEART RATE: 78 BPM | SYSTOLIC BLOOD PRESSURE: 128 MMHG | TEMPERATURE: 99 F | DIASTOLIC BLOOD PRESSURE: 82 MMHG

## 2025-01-16 DIAGNOSIS — R10.33 PERIUMBILICAL ABDOMINAL PAIN: Primary | ICD-10-CM

## 2025-01-16 LAB
ALBUMIN SERPL-MCNC: 3.9 G/DL (ref 3.5–5)
ALBUMIN/GLOB SERPL: 1 RATIO (ref 1.1–2)
ALP SERPL-CCNC: 98 UNIT/L (ref 40–150)
ALT SERPL-CCNC: 14 UNIT/L (ref 0–55)
ANION GAP SERPL CALC-SCNC: 9 MEQ/L
AST SERPL-CCNC: 18 UNIT/L (ref 5–34)
BASOPHILS # BLD AUTO: 0.04 X10(3)/MCL
BASOPHILS NFR BLD AUTO: 0.3 %
BILIRUB SERPL-MCNC: 0.4 MG/DL
BILIRUB UR QL STRIP.AUTO: NEGATIVE
BUN SERPL-MCNC: 18.2 MG/DL (ref 8.9–20.6)
CALCIUM SERPL-MCNC: 9.5 MG/DL (ref 8.4–10.2)
CHLORIDE SERPL-SCNC: 104 MMOL/L (ref 98–107)
CLARITY UR: CLEAR
CO2 SERPL-SCNC: 28 MMOL/L (ref 22–29)
COLOR UR AUTO: YELLOW
CREAT SERPL-MCNC: 1.43 MG/DL (ref 0.72–1.25)
CREAT/UREA NIT SERPL: 13
EOSINOPHIL # BLD AUTO: 0.23 X10(3)/MCL (ref 0–0.9)
EOSINOPHIL NFR BLD AUTO: 1.8 %
ERYTHROCYTE [DISTWIDTH] IN BLOOD BY AUTOMATED COUNT: 12.3 % (ref 11.5–17)
GFR SERPLBLD CREATININE-BSD FMLA CKD-EPI: >60 ML/MIN/1.73/M2
GLOBULIN SER-MCNC: 3.9 GM/DL (ref 2.4–3.5)
GLUCOSE SERPL-MCNC: 82 MG/DL (ref 74–100)
GLUCOSE UR QL STRIP: NEGATIVE
HCT VFR BLD AUTO: 43.4 % (ref 42–52)
HGB BLD-MCNC: 14.4 G/DL (ref 14–18)
HGB UR QL STRIP: NEGATIVE
IMM GRANULOCYTES # BLD AUTO: 0.05 X10(3)/MCL (ref 0–0.04)
IMM GRANULOCYTES NFR BLD AUTO: 0.4 %
KETONES UR QL STRIP: NEGATIVE
LEUKOCYTE ESTERASE UR QL STRIP: NEGATIVE
LIPASE SERPL-CCNC: 27 U/L
LYMPHOCYTES # BLD AUTO: 4.08 X10(3)/MCL (ref 0.6–4.6)
LYMPHOCYTES NFR BLD AUTO: 32.1 %
MCH RBC QN AUTO: 27.7 PG (ref 27–31)
MCHC RBC AUTO-ENTMCNC: 33.2 G/DL (ref 33–36)
MCV RBC AUTO: 83.5 FL (ref 80–94)
MONOCYTES # BLD AUTO: 0.87 X10(3)/MCL (ref 0.1–1.3)
MONOCYTES NFR BLD AUTO: 6.9 %
NEUTROPHILS # BLD AUTO: 7.43 X10(3)/MCL (ref 2.1–9.2)
NEUTROPHILS NFR BLD AUTO: 58.5 %
NITRITE UR QL STRIP: NEGATIVE
NRBC BLD AUTO-RTO: 0 %
PH UR STRIP: 6 [PH]
PLATELET # BLD AUTO: 298 X10(3)/MCL (ref 130–400)
PMV BLD AUTO: 9.5 FL (ref 7.4–10.4)
POTASSIUM SERPL-SCNC: 3.7 MMOL/L (ref 3.5–5.1)
PROT SERPL-MCNC: 7.8 GM/DL (ref 6.4–8.3)
PROT UR QL STRIP: NEGATIVE
RBC # BLD AUTO: 5.2 X10(6)/MCL (ref 4.7–6.1)
SODIUM SERPL-SCNC: 141 MMOL/L (ref 136–145)
SP GR UR STRIP.AUTO: 1.02 (ref 1–1.03)
UROBILINOGEN UR STRIP-ACNC: 0.2
WBC # BLD AUTO: 12.7 X10(3)/MCL (ref 4.5–11.5)

## 2025-01-16 PROCEDURE — 81003 URINALYSIS AUTO W/O SCOPE: CPT

## 2025-01-16 PROCEDURE — 96375 TX/PRO/DX INJ NEW DRUG ADDON: CPT

## 2025-01-16 PROCEDURE — 85025 COMPLETE CBC W/AUTO DIFF WBC: CPT

## 2025-01-16 PROCEDURE — 80053 COMPREHEN METABOLIC PANEL: CPT

## 2025-01-16 PROCEDURE — 63600175 PHARM REV CODE 636 W HCPCS

## 2025-01-16 PROCEDURE — 25500020 PHARM REV CODE 255

## 2025-01-16 PROCEDURE — 96374 THER/PROPH/DIAG INJ IV PUSH: CPT

## 2025-01-16 PROCEDURE — 83690 ASSAY OF LIPASE: CPT

## 2025-01-16 PROCEDURE — 25000003 PHARM REV CODE 250

## 2025-01-16 PROCEDURE — 99285 EMERGENCY DEPT VISIT HI MDM: CPT | Mod: 25

## 2025-01-16 PROCEDURE — 96361 HYDRATE IV INFUSION ADD-ON: CPT

## 2025-01-16 RX ORDER — ONDANSETRON HYDROCHLORIDE 2 MG/ML
4 INJECTION, SOLUTION INTRAVENOUS
Status: COMPLETED | OUTPATIENT
Start: 2025-01-16 | End: 2025-01-16

## 2025-01-16 RX ORDER — DICYCLOMINE HYDROCHLORIDE 20 MG/1
20 TABLET ORAL 2 TIMES DAILY PRN
Qty: 20 TABLET | Refills: 0 | Status: SHIPPED | OUTPATIENT
Start: 2025-01-16

## 2025-01-16 RX ORDER — SODIUM CHLORIDE 9 MG/ML
1000 INJECTION, SOLUTION INTRAVENOUS
Status: COMPLETED | OUTPATIENT
Start: 2025-01-16 | End: 2025-01-16

## 2025-01-16 RX ORDER — MORPHINE SULFATE 4 MG/ML
4 INJECTION, SOLUTION INTRAMUSCULAR; INTRAVENOUS
Status: COMPLETED | OUTPATIENT
Start: 2025-01-16 | End: 2025-01-16

## 2025-01-16 RX ADMIN — IOHEXOL 100 ML: 350 INJECTION, SOLUTION INTRAVENOUS at 08:01

## 2025-01-16 RX ADMIN — MORPHINE SULFATE 4 MG: 4 INJECTION INTRAVENOUS at 08:01

## 2025-01-16 RX ADMIN — ONDANSETRON 4 MG: 2 INJECTION INTRAMUSCULAR; INTRAVENOUS at 08:01

## 2025-01-16 RX ADMIN — SODIUM CHLORIDE 1000 ML: 9 INJECTION, SOLUTION INTRAVENOUS at 08:01

## 2025-01-17 NOTE — ED PROVIDER NOTES
Encounter Date: 1/16/2025       History     Chief Complaint   Patient presents with    Abdominal Pain     Abdominal pain for several days. Denies N,V,D     29 y.o.  male presents to Emergency Department with a chief complaint of abdominal pain. Symptoms began three days ago and have been constant since onset. Associated symptoms include none. Symptoms are aggravated with palpation and there are no alleviating factors. The patient denies CP, SOB, vomiting, diarrhea, or dizziness. No other reported symptoms at this time. LBM: today (normal per patient).       The history is provided by the patient. No  was used.   Abdominal Pain  The current episode started several days ago. The onset of the illness was abrupt. The problem has not changed since onset.The abdominal pain is located in the periumbilical region. The abdominal pain does not radiate. The other symptoms of the illness do not include fatigue, shortness of breath, nausea, vomiting or diarrhea.   The patient has not had a change in bowel habit. Symptoms associated with the illness do not include chills, anorexia, diaphoresis or heartburn.     Review of patient's allergies indicates:  No Known Allergies  Past Medical History:   Diagnosis Date    Known health problems: none      Past Surgical History:   Procedure Laterality Date    ANKLE SURGERY Left      Family History   Problem Relation Name Age of Onset    No Known Problems Mother      No Known Problems Father       Social History     Tobacco Use    Smoking status: Never    Smokeless tobacco: Never   Substance Use Topics    Alcohol use: Never    Drug use: Never     Review of Systems   Constitutional:  Negative for chills, diaphoresis and fatigue.   Respiratory:  Negative for cough, shortness of breath, wheezing and stridor.    Cardiovascular:  Negative for chest pain and leg swelling.   Gastrointestinal:  Positive for abdominal pain. Negative for anorexia, diarrhea,  heartburn, nausea and vomiting.   Musculoskeletal:  Negative for gait problem, joint swelling and myalgias.   All other systems reviewed and are negative.      Physical Exam     Initial Vitals [01/16/25 1905]   BP Pulse Resp Temp SpO2   (!) 130/95 82 18 98.5 °F (36.9 °C) 99 %      MAP       --         Physical Exam    Nursing note and vitals reviewed.  Constitutional: He appears well-developed and well-nourished. He is not diaphoretic. He is cooperative.  Non-toxic appearance. No distress.   HENT:   Head: Normocephalic and atraumatic.   Right Ear: External ear normal.   Left Ear: External ear normal.   Nose: Nose normal.   Eyes: Conjunctivae and EOM are normal. Pupils are equal, round, and reactive to light.   Neck: Neck supple.   Normal range of motion.  Cardiovascular:  Normal rate, regular rhythm, S1 normal, S2 normal, normal heart sounds, intact distal pulses and normal pulses.           Pulmonary/Chest: Effort normal and breath sounds normal. No tachypnea and no bradypnea. No respiratory distress. He has no decreased breath sounds. He has no wheezes. He has no rhonchi. He has no rales. He exhibits no tenderness.   Abdominal: Abdomen is soft. Bowel sounds are normal. He exhibits no distension. There is abdominal tenderness in the periumbilical area.   Describes as sharp.  There is no rebound and no guarding.   Musculoskeletal:         General: Normal range of motion.      Cervical back: Normal range of motion and neck supple.     Neurological: He is alert and oriented to person, place, and time. He has normal strength. No sensory deficit. GCS score is 15. GCS eye subscore is 4. GCS verbal subscore is 5. GCS motor subscore is 6.   Skin: Skin is warm and dry. Capillary refill takes less than 2 seconds.   Psychiatric: He has a normal mood and affect. Thought content normal.         ED Course   Procedures  Labs Reviewed   COMPREHENSIVE METABOLIC PANEL - Abnormal       Result Value    Sodium 141      Potassium 3.7       Chloride 104      CO2 28      Glucose 82      Blood Urea Nitrogen 18.2      Creatinine 1.43 (*)     Calcium 9.5      Protein Total 7.8      Albumin 3.9      Globulin 3.9 (*)     Albumin/Globulin Ratio 1.0 (*)     Bilirubin Total 0.4      ALP 98      ALT 14      AST 18      eGFR >60      Anion Gap 9.0      BUN/Creatinine Ratio 13     CBC WITH DIFFERENTIAL - Abnormal    WBC 12.70 (*)     RBC 5.20      Hgb 14.4      Hct 43.4      MCV 83.5      MCH 27.7      MCHC 33.2      RDW 12.3      Platelet 298      MPV 9.5      Neut % 58.5      Lymph % 32.1      Mono % 6.9      Eos % 1.8      Basophil % 0.3      Imm Grans % 0.4      Neut # 7.43      Lymph # 4.08      Mono # 0.87      Eos # 0.23      Baso # 0.04      Imm Gran # 0.05 (*)     NRBC% 0.0     LIPASE - Normal    Lipase Level 27     URINALYSIS, REFLEX TO URINE CULTURE - Normal    Color, UA Yellow      Appearance, UA Clear      Specific Gravity, UA 1.025      pH, UA 6.0      Protein, UA Negative      Glucose, UA Negative      Ketones, UA Negative      Blood, UA Negative      Bilirubin, UA Negative      Urobilinogen, UA 0.2      Nitrites, UA Negative      Leukocyte Esterase, UA Negative     CBC W/ AUTO DIFFERENTIAL    Narrative:     The following orders were created for panel order CBC W/ AUTO DIFFERENTIAL.  Procedure                               Abnormality         Status                     ---------                               -----------         ------                     CBC with Differential[[id number]]        Abnormal            Final result                 Please view results for these tests on the individual orders.          Imaging Results              CT Abdomen Pelvis With IV Contrast NO Oral Contrast (Preliminary result)  Result time 01/16/25 21:09:47      Preliminary result by Lonnie Juan MD (01/16/25 21:09:47)                   Narrative:    START OF REPORT:  Technique: CT of the abdomen and pelvis was performed with axial images as well as sagittal  and coronal reconstruction images with intravenous contrast.    Comparison: Comparison is with study dated 2022-04-25 13:02:01.    Clinical History: Mid abdominal pain x3 days.    Dosage Information: Automated Exposure Control was utilized.    Findings:  Lines and Tubes: None.  Thorax:  Lungs: There is mild nonspecific dependent change at the lung bases. No focal infiltrate or consolidation is seen.  Pleura: No effusions or thickening.  Heart: The heart size is within normal limits.  Abdomen:  Abdominal Wall: No abdominal wall pathology is seen.  Liver: The liver appears unremarkable.  Biliary System: No intrahepatic or extrahepatic biliary duct dilatation is seen.  Gallbladder: The gallbladder appears unremarkable.  Pancreas: The pancreas appears unremarkable.  Spleen: The spleen appears unremarkable. Splenule measuring 9.4 mm in diameter is seen on Image 19, Series 2 inferior to the spleen.  Adrenals: The adrenal glands appear unremarkable.  Kidneys: The kidneys appear unremarkable with no stones cysts masses or hydronephrosis.  Aorta: The abdominal aorta appears unremarkable.  IVC: Unremarkable.  Bowel:  Esophagus: The visualized esophagus appears unremarkable.  Stomach: The stomach appears unremarkable.  Duodenum: Unremarkable appearing duodenum.  Small Bowel: The small bowel appears unremarkable.  Colon: Nondistended.  Appendix: The appendix appears unremarkable seen on Image 41, Series 2 through Image 26, Series 2.  Peritoneum: No intraperitoneal free air or ascites is seen.    Pelvis:  Bladder: The bladder appears unremarkable.  Male:  Prostate gland: The prostate gland appears unremarkable.    Bony structures:  Dorsal Spine: The visualized dorsal spine appears unremarkable.  Bony Pelvis: The visualized bony structures of the pelvis appear unremarkable.      Impression:  1. No acute intraabdominal or pelvic solid organ or bowel pathology identified. Details and other findings as discussed above.                                          Medications   0.9% NaCl infusion (1,000 mLs Intravenous New Bag 1/16/25 2005)   morphine injection 4 mg (4 mg Intravenous Given 1/16/25 2004)   ondansetron injection 4 mg (4 mg Intravenous Given 1/16/25 2004)   iohexoL (OMNIPAQUE 350) injection 100 mL (100 mLs Intravenous Given 1/16/25 2035)     Medical Decision Making  Patient awake, alert, has non-labored breathing, and follows commands appropriately. Arrived to ED due to abdominal pain. Symptoms began three days ago. Denies n/v/d or any additional complaints. Having normal bowel movements. Afebrile. NAD Noted.     Judging by the patient's chief complaint and pertinent history, the patient has the following possible differential diagnoses, including but not limited to the following: Abdominal Pain, UTI, Constipation     Some of these are deemed to be lower likelihood and some more likely based on my physical exam and history combined with possible lab work and/or imaging studies. Please see the pertinent studies, and refer to the HPI. Some of these diagnoses will take further evaluation to fully rule out, perhaps as an outpatient and the patient was encouraged to follow up when discharged for more comprehensive evaluation.       Amount and/or Complexity of Data Reviewed  Labs: ordered. Decision-making details documented in ED Course.     Details: Mild leukocytosis noted. Cr 1.43. UA unremarkable. Informed patient of results.   Radiology: ordered. Decision-making details documented in ED Course.     Details: Informed patient of results.   Discussion of management or test interpretation with external provider(s): Discussed plan of care and interventions with patient. Agreed to and aware of plan of care. Comfortable being discharged home. Patient discharged home. Patient denies new or additional complaints; no further tests indicated at this time. Verbalized understanding of instructions. No emergent or apparent distress noted prior to  discharge. Strict ER return precautions given.       Risk  OTC drugs.  Prescription drug management.               ED Course as of 01/16/25 2136   Thu Jan 16, 2025 1958 WBC(!): 12.70 [JA]   1958 Creatinine(!): 1.43  Trending higher than previous labs.  [JA]   2116 CT Abdomen Pelvis With IV Contrast NO Oral Contrast  1. No acute intraabdominal or pelvic solid organ or bowel pathology identified. Details and other findings as discussed above. [JA]   2131 Patient reports improvement in symptoms after med admin. Resting comfortably. Discussed results with patient. Encouraged to hydrate based on Cr increase. Imaging unremarkable. At disposition, patient has no additional complaints, symptoms improved, has no n/v/d, and non-toxic in appearance. Stable for discharge home.  [JA]      ED Course User Index  [JA] Tamie Snowden, NP                           Clinical Impression:  Final diagnoses:  [R10.33] Periumbilical abdominal pain (Primary)          ED Disposition Condition    Discharge Stable          ED Prescriptions       Medication Sig Dispense Start Date End Date Auth. Provider    dicyclomine (BENTYL) 20 mg tablet Take 1 tablet (20 mg total) by mouth 2 (two) times daily as needed. 20 tablet 1/16/2025 -- Tamie Snowden, NP          Follow-up Information       Follow up With Specialties Details Why Contact Info    PCP  Call in 1 day [phone] to establish PCP.     [facility] - Emergency Dept Emergency Medicine Go to  If symptoms worsen, As needed [address]  [facility] [id number] [phone]             Tamie Snowden, NP  01/16/25 [id number]

## 2025-04-08 ENCOUNTER — HOSPITAL ENCOUNTER (EMERGENCY)
Facility: HOSPITAL | Age: 30
Discharge: HOME | End: 2025-04-08
Attending: STUDENT IN AN ORGANIZED HEALTH CARE EDUCATION/TRAINING PROGRAM
Payer: MEDICAID

## 2025-04-08 VITALS
BODY MASS INDEX: 31.29 KG/M2 | RESPIRATION RATE: 18 BRPM | HEIGHT: 77 IN | WEIGHT: 265 LBS | SYSTOLIC BLOOD PRESSURE: 142 MMHG | DIASTOLIC BLOOD PRESSURE: 78 MMHG | HEART RATE: 80 BPM | TEMPERATURE: 99 F | OXYGEN SATURATION: 98 %

## 2025-04-08 DIAGNOSIS — L02.211 ABSCESS OF SKIN OF ABDOMEN: Primary | ICD-10-CM

## 2025-04-08 PROCEDURE — 99283 EMERGENCY DEPT VISIT LOW MDM: CPT

## 2025-04-08 RX ORDER — SULFAMETHOXAZOLE AND TRIMETHOPRIM 800; 160 MG/1; MG/1
1 TABLET ORAL 2 TIMES DAILY
Qty: 14 TABLET | Refills: 0 | Status: SHIPPED | OUTPATIENT
Start: 2025-04-08 | End: 2025-04-15

## 2025-04-08 RX ORDER — ROSUVASTATIN CALCIUM 20 MG/1
20 TABLET, COATED ORAL NIGHTLY
COMMUNITY
Start: 2025-04-02

## 2025-04-09 NOTE — ED PROVIDER NOTES
Encounter Date: 4/8/2025       History     Chief Complaint   Patient presents with    Abscess     HPI    29-year-old male with no stated past medical history presents emergency department for concerns of an abscess to his left lower abdomen bottom of the wall.  Patient states it started about a week ago.  States it became hard and now it is more tender.  States it has not changed in size.  No fever.  No wound.    Review of patient's allergies indicates:  No Known Allergies  Past Medical History:   Diagnosis Date    High cholesterol      Past Surgical History:   Procedure Laterality Date    ANKLE SURGERY Left      Family History   Problem Relation Name Age of Onset    No Known Problems Mother      No Known Problems Father       Social History[1]  Review of Systems   Constitutional:  Negative for fever.   Respiratory:  Negative for cough.    Cardiovascular:  Negative for chest pain.   Gastrointestinal:  Negative for abdominal pain, constipation, diarrhea, nausea and vomiting.   Skin:  Positive for color change.   Neurological:  Negative for headaches.   All other systems reviewed and are negative.      Physical Exam     Initial Vitals [04/08/25 2141]   BP Pulse Resp Temp SpO2   (!) 142/78 80 18 98.7 °F (37.1 °C) 98 %      MAP       --         Physical Exam    Nursing note and vitals reviewed.  Constitutional: He appears well-developed and well-nourished. No distress.   Cardiovascular:  Normal rate and regular rhythm.           Pulmonary/Chest: Breath sounds normal. No respiratory distress.   Abdominal: Abdomen is soft. There is no abdominal tenderness.   Musculoskeletal:         General: No tenderness. Normal range of motion.     Neurological: He is alert and oriented to person, place, and time.   Skin: Skin is warm. Capillary refill takes less than 2 seconds. Abscess (small abscess like density to the left lower abdomen/pubic region.  Mild fluctuance with no erythema.) noted.         ED Course   Procedures  Labs  Reviewed - No data to display       Imaging Results    None          Medications - No data to display  Medical Decision Making  Initial Assessment:       abscess      Differential Diagnosis:   Judging by the patient's chief complaint and pertinent history, the patient has the following possible differential diagnoses, including but not limited to the following.  Some of these are deemed to be lower likelihood and some more likely based on my physical exam and history combined with possible lab work and/or imaging studies.   Please see the pertinent studies, and refer to the HPI.  Some of these diagnoses will take further evaluation to fully rule out, perhaps as an outpatient and the patient was encouraged to follow up when discharged for more comprehensive evaluation.      Abscess, folliculitis, lymphadenopathy,  as well as multiple other possible etiologies    Patient requesting oral antibiotics.  No I and D at this time.  Informed patient to return if area worsens as I&D is the standard of care.  He requests to try antibiotics by mouth 1st prior to I&D.    Problems Addressed:  Abscess of skin of abdomen: self-limited or minor problem    Risk  Prescription drug management.                                      Clinical Impression:  Final diagnoses:  [L02.211] Abscess of skin of abdomen (Primary)          ED Disposition Condition    Discharge Stable          ED Prescriptions       Medication Sig Dispense Start Date End Date Auth. Provider    sulfamethoxazole-trimethoprim 800-160mg (BACTRIM DS) 800-160 mg Tab Take 1 tablet by mouth 2 (two) times daily. for 7 days 14 tablet 4/8/2025 4/15/2025 Anthony Castellanos MD          Follow-up Information       Follow up With Specialties Details Why Contact Info    [facility] -  Schedule an appointment as soon as possible for a visit in 2 days  [address] [facility] [id number] [phone]      [facility] - Emergency  Dept Emergency Medicine Go to  If symptoms worsen [address]  [facility] [id number] [phone]               [1]   Social History  Tobacco Use    Smoking status: Never    Smokeless tobacco: Never   Substance Use Topics    Alcohol use: Never    Drug use: Never        Anthony Castellanos MD  04/08/25 2488

## 2025-04-25 ENCOUNTER — HOSPITAL ENCOUNTER (EMERGENCY)
Facility: HOSPITAL | Age: 30
Discharge: HOME | End: 2025-04-25
Attending: EMERGENCY MEDICINE
Payer: MEDICAID

## 2025-04-25 VITALS
TEMPERATURE: 99 F | WEIGHT: 265 LBS | HEART RATE: 80 BPM | HEIGHT: 77 IN | RESPIRATION RATE: 18 BRPM | DIASTOLIC BLOOD PRESSURE: 78 MMHG | OXYGEN SATURATION: 100 % | BODY MASS INDEX: 31.29 KG/M2 | SYSTOLIC BLOOD PRESSURE: 131 MMHG

## 2025-04-25 DIAGNOSIS — N50.812 PAIN IN LEFT TESTICLE: Primary | ICD-10-CM

## 2025-04-25 DIAGNOSIS — N43.3 HYDROCELE, UNSPECIFIED HYDROCELE TYPE: ICD-10-CM

## 2025-04-25 DIAGNOSIS — N50.819 TESTICLE PAIN: ICD-10-CM

## 2025-04-25 LAB
BILIRUB UR QL STRIP.AUTO: NEGATIVE
C TRACH DNA SPEC QL NAA+PROBE: NOT DETECTED
CLARITY UR: CLEAR
COLOR UR AUTO: YELLOW
GLUCOSE UR QL STRIP: NEGATIVE
HGB UR QL STRIP: NEGATIVE
KETONES UR QL STRIP: NEGATIVE
LEUKOCYTE ESTERASE UR QL STRIP: NEGATIVE
N GONORRHOEA DNA SPEC QL NAA+PROBE: NOT DETECTED
NITRITE UR QL STRIP: NEGATIVE
PH UR STRIP: 6 [PH]
PROT UR QL STRIP: NEGATIVE
SOURCE (OHS): NORMAL
SP GR UR STRIP.AUTO: 1.02 (ref 1–1.03)
UROBILINOGEN UR STRIP-ACNC: 0.2

## 2025-04-25 PROCEDURE — 96372 THER/PROPH/DIAG INJ SC/IM: CPT | Performed by: NURSE PRACTITIONER

## 2025-04-25 PROCEDURE — 81003 URINALYSIS AUTO W/O SCOPE: CPT | Performed by: EMERGENCY MEDICINE

## 2025-04-25 PROCEDURE — 99284 EMERGENCY DEPT VISIT MOD MDM: CPT | Mod: 25

## 2025-04-25 PROCEDURE — 63600175 PHARM REV CODE 636 W HCPCS: Mod: JZ,TB | Performed by: NURSE PRACTITIONER

## 2025-04-25 PROCEDURE — 87491 CHLMYD TRACH DNA AMP PROBE: CPT | Performed by: NURSE PRACTITIONER

## 2025-04-25 RX ORDER — KETOROLAC TROMETHAMINE 30 MG/ML
60 INJECTION, SOLUTION INTRAMUSCULAR; INTRAVENOUS
Status: COMPLETED | OUTPATIENT
Start: 2025-04-25 | End: 2025-04-25

## 2025-04-25 RX ORDER — IBUPROFEN 600 MG/1
600 TABLET ORAL EVERY 8 HOURS PRN
Qty: 15 TABLET | Refills: 0 | Status: SHIPPED | OUTPATIENT
Start: 2025-04-25

## 2025-04-25 RX ORDER — HYDROCODONE BITARTRATE AND ACETAMINOPHEN 5; 325 MG/1; MG/1
1 TABLET ORAL EVERY 6 HOURS PRN
Qty: 12 TABLET | Refills: 0 | Status: SHIPPED | OUTPATIENT
Start: 2025-04-25

## 2025-04-25 RX ADMIN — KETOROLAC TROMETHAMINE 60 MG: 30 INJECTION, SOLUTION INTRAMUSCULAR at 04:04

## 2025-04-25 NOTE — Clinical Note
"Brian Coronado" Nusrat was seen and treated in our emergency department on 4/25/2025.  He may return to work on 04/28/2025.       If you have any questions or concerns, please don't hesitate to call.      Rhonda Enrique, FLAVIO"

## 2025-04-25 NOTE — ED PROVIDER NOTES
Encounter Date: 4/25/2025       History     Chief Complaint   Patient presents with    Testicle Pain     Pt complaint of left testicle pain for 3 days     Patient states left-sided testicular pain times several days.  Denies any redness, swelling, fever, injury or trauma, or drainage.  Patient denies any dysuria, hematuria, or penile discharge.  Patient states that he did lift weights and workout his legs by lifting weights with them last week.  Patient states that pain is intermittent and worsens with palpation and movement.  States past medical history of high cholesterol.    The history is provided by the patient and a significant other.   Testicle Pain  This is a new problem. The current episode started more than 2 days ago. Episode frequency: Intermittently. The problem has not changed since onset.Pertinent negatives include no chest pain, no abdominal pain, no headaches and no shortness of breath. Exacerbated by: Palpation.     Review of patient's allergies indicates:  No Known Allergies  Past Medical History:   Diagnosis Date    High cholesterol      Past Surgical History:   Procedure Laterality Date    ANKLE SURGERY Left      Family History   Problem Relation Name Age of Onset    No Known Problems Mother      No Known Problems Father       Social History[1]  Review of Systems   Constitutional: Negative.  Negative for fever.   HENT: Negative.     Eyes: Negative.    Respiratory: Negative.  Negative for shortness of breath.    Cardiovascular: Negative.  Negative for chest pain.   Gastrointestinal: Negative.  Negative for abdominal pain.   Endocrine: Negative.    Genitourinary:  Positive for testicular pain. Negative for difficulty urinating, dysuria, frequency, genital sores, hematuria, penile discharge, penile pain, penile swelling and scrotal swelling.   Musculoskeletal: Negative.    Skin: Negative.  Negative for wound.   Allergic/Immunologic: Negative.    Neurological: Negative.  Negative for headaches.    Hematological: Negative.    Psychiatric/Behavioral: Negative.     All other systems reviewed and are negative.      Physical Exam     Initial Vitals [04/25/25 1454]   BP Pulse Resp Temp SpO2   131/78 80 18 98.6 °F (37 °C) 100 %      MAP       --         Physical Exam    Nursing note and vitals reviewed.  Constitutional: He appears well-developed and well-nourished. No distress.   HENT:   Head: Normocephalic and atraumatic. Mouth/Throat: Oropharynx is clear and moist.   Eyes: Conjunctivae and EOM are normal. Pupils are equal, round, and reactive to light.   Neck: Neck supple.   Normal range of motion.  Cardiovascular:  Normal rate, regular rhythm, normal heart sounds and intact distal pulses.           Pulmonary/Chest: Breath sounds normal. No respiratory distress. He has no wheezes.   Abdominal: Abdomen is soft. He exhibits no distension. There is no abdominal tenderness. Hernia confirmed negative in the right inguinal area and confirmed negative in the left inguinal area.   Genitourinary:    Penis normal.   Right testis shows no mass, no swelling and no tenderness. Left testis shows no mass, no swelling and no tenderness.   Musculoskeletal:         General: No edema. Normal range of motion.      Cervical back: Normal range of motion and neck supple.     Neurological: He is alert and oriented to person, place, and time. He has normal strength. Gait normal. GCS score is 15. GCS eye subscore is 4. GCS verbal subscore is 5. GCS motor subscore is 6.   Skin: Skin is warm and dry. No rash noted.   Psychiatric: He has a normal mood and affect. Thought content normal.         ED Course   Procedures  Labs Reviewed   URINALYSIS, REFLEX TO URINE CULTURE - Normal       Result Value    Color, UA Yellow      Appearance, UA Clear      Specific Gravity, UA 1.025      pH, UA 6.0      Protein, UA Negative      Glucose, UA Negative      Ketones, UA Negative      Blood, UA Negative      Bilirubin, UA Negative      Urobilinogen, UA  0.2      Nitrites, UA Negative      Leukocyte Esterase, UA Negative     CHLAMYDIA/GONORRHOEAE(GC), PCR          Imaging Results              US Scrotum And Testicles (Final result)  Result time 04/25/25 15:54:28      Final result by Dami Cross MD (04/25/25 15:54:28)                   Impression:      Normal testicles.  Trace left hydrocele.      Electronically signed by: Dami Cross  Date:    04/25/2025  Time:    15:54               Narrative:    EXAMINATION:  US SCROTUM AND TESTICLES    CLINICAL HISTORY:  Testicular pain, unspecified    TECHNIQUE:  Gray-scale, color Doppler, and spectral waveform tracings of the scrotum.    COMPARISON:  No relevant comparison studies available at the time of dictation.    FINDINGS:  No intratesticular mass identified. Testicular blood flow documented bilaterally on Doppler evaluation. Epididymides within normal limits. Trace left hydrocele.    Measurements:    - right testicle: 4.2 x 2.2 x 2.9 cm    - left testicle: 3.7 x 2.0 x 2.3 cm                                       Medications   ketorolac injection 60 mg (60 mg Intramuscular Given 4/25/25 1624)     Medical Decision Making  Patient states left-sided testicular pain times several days.  Denies any redness, swelling, fever, injury or trauma, or drainage.  Patient denies any dysuria, hematuria, or penile discharge.  Patient states that he did lift weights and workout his legs by lifting weights with them last week.  Patient states that pain is intermittent and worsens with palpation and movement.  States past medical history of high cholesterol.    The history is provided by the patient and a significant other.   Testicle Pain  This is a new problem. The current episode started more than 2 days ago. Episode frequency: Intermittently. The problem has not changed since onset.Pertinent negatives include no chest pain, no abdominal pain, no headaches and no shortness of breath. Exacerbated by: Palpation.       Amount and/or  Complexity of Data Reviewed  Labs: ordered. Decision-making details documented in ED Course.  Radiology: ordered. Decision-making details documented in ED Course.  Discussion of management or test interpretation with external provider(s): Differential diagnosis (including but not limited to):   Judging by the patient's chief complaint and pertinent history, the patient has the following possible differential diagnoses, including but not limited to the following.  Some of these are deemed to be lower likelihood and some more likely based on my physical exam and history combined with possible lab work and/or imaging studies.   Please see the pertinent studies, and refer to the HPI.  Some of these diagnoses will take further evaluation to fully rule out, perhaps as an outpatient and the patient was encouraged to follow up when discharged for more comprehensive evaluation.  Testicular torsion, hydrocele, testicular pain, abscess, STD  Patient's UA is negative.  Patient's ultrasound of his testicles shows no acute change with a trace hydrocele on the left side.  Patient was given Toradol IM for pain in the ED. Discussed with patient his results.  We will discharge patient with pain control and instructed him to follow up with Urology and his PCP.  Patient states understanding and agreement with discharge plan.  ED return precautions were given.      Risk  Prescription drug management.               ED Course as of 04/25/25 1628   Fri Apr 25, 2025   1532 Urinalysis, Reflex to Urine Culture [AB]      ED Course User Index  [AB] Rhonda Enrique FNP                           Clinical Impression:  Final diagnoses:  [N50.819] Testicle pain  [N50.812] Pain in left testicle (Primary)  [N43.3] Hydrocele, unspecified hydrocele type          ED Disposition Condition    Discharge Stable          ED Prescriptions       Medication Sig Dispense Start Date End Date Auth. Provider    ibuprofen (ADVIL,MOTRIN) 600 MG tablet Take 1 tablet  (600 mg total) by mouth every 8 (eight) hours as needed for Pain. 15 tablet 4/25/2025 -- Rhonda Enrique FNP    HYDROcodone-acetaminophen (NORCO) 5-325 mg per tablet Take 1 tablet by mouth every 6 (six) hours as needed for Pain. 12 tablet 4/25/2025 -- Rhonda Enrique FNP          Follow-up Information       Follow up With Specialties Details Why Contact Info    Renu Stewart [facility] -  In 3 days  [address] [id number] [phone]      Idris Trevizo MD Urology In 3 days  [address]  [facility] [id number] [phone]                 [1]   Social History  Tobacco Use    Smoking status: Never    Smokeless tobacco: Never   Substance Use Topics    Alcohol use: Never    Drug use: Never        Rhonda Enrique FNP  04/25/25 8840

## 2025-08-09 ENCOUNTER — HOSPITAL ENCOUNTER (EMERGENCY)
Facility: HOSPITAL | Age: 30
Discharge: HOME | End: 2025-08-09
Attending: EMERGENCY MEDICINE
Payer: MEDICAID

## 2025-08-09 VITALS
WEIGHT: 250 LBS | HEART RATE: 76 BPM | BODY MASS INDEX: 29.52 KG/M2 | SYSTOLIC BLOOD PRESSURE: 132 MMHG | RESPIRATION RATE: 16 BRPM | DIASTOLIC BLOOD PRESSURE: 79 MMHG | OXYGEN SATURATION: 97 % | HEIGHT: 77 IN | TEMPERATURE: 98 F

## 2025-08-09 DIAGNOSIS — L02.91 ABSCESS: Primary | ICD-10-CM

## 2025-08-09 PROCEDURE — 10060 I&D ABSCESS SIMPLE/SINGLE: CPT

## 2025-08-09 PROCEDURE — 99284 EMERGENCY DEPT VISIT MOD MDM: CPT | Mod: 25

## 2025-08-09 RX ORDER — OXYCODONE AND ACETAMINOPHEN 5; 325 MG/1; MG/1
1 TABLET ORAL EVERY 6 HOURS PRN
Qty: 12 TABLET | Refills: 0 | Status: SHIPPED | OUTPATIENT
Start: 2025-08-09

## 2025-08-09 RX ORDER — CLINDAMYCIN HYDROCHLORIDE 300 MG/1
300 CAPSULE ORAL EVERY 8 HOURS
Qty: 21 CAPSULE | Refills: 0 | Status: SHIPPED | OUTPATIENT
Start: 2025-08-09 | End: 2025-08-16